# Patient Record
Sex: FEMALE | Race: WHITE | Employment: UNEMPLOYED | URBAN - METROPOLITAN AREA
[De-identification: names, ages, dates, MRNs, and addresses within clinical notes are randomized per-mention and may not be internally consistent; named-entity substitution may affect disease eponyms.]

---

## 2020-05-20 ENCOUNTER — APPOINTMENT (OUTPATIENT)
Dept: ULTRASOUND IMAGING | Facility: HOSPITAL | Age: 30
End: 2020-05-20
Attending: OBSTETRICS & GYNECOLOGY
Payer: COMMERCIAL

## 2020-05-20 ENCOUNTER — HOSPITAL ENCOUNTER (OUTPATIENT)
Facility: HOSPITAL | Age: 30
Setting detail: OBSERVATION
Discharge: HOME | End: 2020-05-22
Attending: OBSTETRICS & GYNECOLOGY | Admitting: OBSTETRICS & GYNECOLOGY
Payer: COMMERCIAL

## 2020-05-20 PROBLEM — Z34.90 PREGNANCY: Status: ACTIVE | Noted: 2020-05-20

## 2020-05-20 PROCEDURE — 76770 US EXAM ABDO BACK WALL COMP: CPT | Performed by: OBSTETRICS & GYNECOLOGY

## 2020-05-20 PROCEDURE — 99225 SUBSEQUENT OBSERVATION CARE: CPT | Performed by: OBSTETRICS & GYNECOLOGY

## 2020-05-20 RX ORDER — HYDROMORPHONE HYDROCHLORIDE 1 MG/ML
0.5 INJECTION, SOLUTION INTRAMUSCULAR; INTRAVENOUS; SUBCUTANEOUS EVERY 2 HOUR PRN
Status: DISCONTINUED | OUTPATIENT
Start: 2020-05-20 | End: 2020-05-20

## 2020-05-20 RX ORDER — HYDROMORPHONE HYDROCHLORIDE 1 MG/ML
INJECTION, SOLUTION INTRAMUSCULAR; INTRAVENOUS; SUBCUTANEOUS
Status: DISPENSED
Start: 2020-05-20 | End: 2020-05-20

## 2020-05-20 RX ORDER — ACETAMINOPHEN 500 MG
1000 TABLET ORAL EVERY 6 HOURS PRN
Status: DISCONTINUED | OUTPATIENT
Start: 2020-05-20 | End: 2020-05-22

## 2020-05-20 RX ORDER — CALCIUM CARBONATE 200(500)MG
1000 TABLET,CHEWABLE ORAL DAILY PRN
Status: DISCONTINUED | OUTPATIENT
Start: 2020-05-20 | End: 2020-05-22

## 2020-05-20 RX ORDER — GLYCERIN/MINERAL OIL
1 LOTION (ML) TOPICAL DAILY
COMMUNITY

## 2020-05-20 RX ORDER — DIPHENHYDRAMINE HYDROCHLORIDE 50 MG/ML
12.5 INJECTION INTRAMUSCULAR; INTRAVENOUS EVERY 4 HOURS PRN
Status: DISCONTINUED | OUTPATIENT
Start: 2020-05-20 | End: 2020-05-22

## 2020-05-20 RX ORDER — HYDROMORPHONE HYDROCHLORIDE 1 MG/ML
INJECTION, SOLUTION INTRAMUSCULAR; INTRAVENOUS; SUBCUTANEOUS
Status: COMPLETED
Start: 2020-05-20 | End: 2020-05-20

## 2020-05-20 RX ORDER — SERTRALINE HYDROCHLORIDE 25 MG/1
25 TABLET, FILM COATED ORAL DAILY
Status: DISCONTINUED | OUTPATIENT
Start: 2020-05-20 | End: 2020-05-22

## 2020-05-20 RX ORDER — CEFAZOLIN SODIUM/WATER 2 G/20 ML
SYRINGE (ML) INTRAVENOUS
Status: COMPLETED
Start: 2020-05-20 | End: 2020-05-20

## 2020-05-20 RX ORDER — CEFAZOLIN SODIUM/WATER 2 G/20 ML
2 SYRINGE (ML) INTRAVENOUS EVERY 8 HOURS
Status: DISCONTINUED | OUTPATIENT
Start: 2020-05-20 | End: 2020-05-22

## 2020-05-20 RX ORDER — SERTRALINE HYDROCHLORIDE 25 MG/1
25 TABLET, FILM COATED ORAL DAILY
COMMUNITY

## 2020-05-20 RX ORDER — ONDANSETRON 2 MG/ML
4 INJECTION INTRAMUSCULAR; INTRAVENOUS EVERY 6 HOURS PRN
Status: DISCONTINUED | OUTPATIENT
Start: 2020-05-20 | End: 2020-05-22

## 2020-05-20 RX ORDER — HYDROMORPHONE HYDROCHLORIDE 1 MG/ML
1 INJECTION, SOLUTION INTRAMUSCULAR; INTRAVENOUS; SUBCUTANEOUS EVERY 2 HOUR PRN
Status: DISCONTINUED | OUTPATIENT
Start: 2020-05-20 | End: 2020-05-20

## 2020-05-20 RX ORDER — SODIUM CHLORIDE, SODIUM LACTATE, POTASSIUM CHLORIDE, CALCIUM CHLORIDE 600; 310; 30; 20 MG/100ML; MG/100ML; MG/100ML; MG/100ML
INJECTION, SOLUTION INTRAVENOUS CONTINUOUS
Status: DISCONTINUED | OUTPATIENT
Start: 2020-05-20 | End: 2020-05-20

## 2020-05-20 RX ORDER — CALCIUM CARBONATE 200(500)MG
500 TABLET,CHEWABLE ORAL
Status: DISCONTINUED | OUTPATIENT
Start: 2020-05-20 | End: 2020-05-20

## 2020-05-20 RX ORDER — ONDANSETRON 2 MG/ML
4 INJECTION INTRAMUSCULAR; INTRAVENOUS EVERY 6 HOURS PRN
Status: DISCONTINUED | OUTPATIENT
Start: 2020-05-20 | End: 2020-05-20

## 2020-05-20 RX ORDER — SODIUM CHLORIDE, SODIUM LACTATE, POTASSIUM CHLORIDE, CALCIUM CHLORIDE 600; 310; 30; 20 MG/100ML; MG/100ML; MG/100ML; MG/100ML
INJECTION, SOLUTION INTRAVENOUS CONTINUOUS
Status: DISCONTINUED | OUTPATIENT
Start: 2020-05-20 | End: 2020-05-22

## 2020-05-20 RX ORDER — BISACODYL 10 MG
10 SUPPOSITORY, RECTAL RECTAL
Status: DISCONTINUED | OUTPATIENT
Start: 2020-05-20 | End: 2020-05-20

## 2020-05-20 RX ORDER — HYDROMORPHONE HYDROCHLORIDE 1 MG/ML
0.5 INJECTION, SOLUTION INTRAMUSCULAR; INTRAVENOUS; SUBCUTANEOUS ONCE
Status: COMPLETED | OUTPATIENT
Start: 2020-05-20 | End: 2020-05-20

## 2020-05-20 RX ORDER — NALOXONE HYDROCHLORIDE 0.4 MG/ML
0.08 INJECTION, SOLUTION INTRAMUSCULAR; INTRAVENOUS; SUBCUTANEOUS
Status: DISCONTINUED | OUTPATIENT
Start: 2020-05-20 | End: 2020-05-22

## 2020-05-20 RX ORDER — TERBUTALINE SULFATE 1 MG/ML
0.25 INJECTION, SOLUTION SUBCUTANEOUS
Status: DISPENSED | OUTPATIENT
Start: 2020-05-20 | End: 2020-05-20

## 2020-05-20 RX ORDER — ZOLPIDEM TARTRATE 5 MG/1
5 TABLET ORAL NIGHTLY PRN
Status: DISCONTINUED | OUTPATIENT
Start: 2020-05-20 | End: 2020-05-20

## 2020-05-20 RX ORDER — HYDROMORPHONE HYDROCHLORIDE 1 MG/ML
1 INJECTION, SOLUTION INTRAMUSCULAR; INTRAVENOUS; SUBCUTANEOUS ONCE
Status: COMPLETED | OUTPATIENT
Start: 2020-05-20 | End: 2020-05-20

## 2020-05-20 NOTE — PROGRESS NOTES
Patient c/o increasing LBP  Afebrile   Now with emesis  Renal ultrasound with bilateral moderate hydronephrosis, no stone seen  Abdomen soft, contractions resolved after SQ terbutaline  Check CBC  Will consult urology

## 2020-05-20 NOTE — PROGRESS NOTES
[facility]  OB/GYN: Antepartum Progress Note     SUBJECTIVE:  Pt is a 26year old  female at 32w4d who was admitted on 2020 for flank pain, hx of kidney pain. Hx of uretopelvic outlet obstruction  Fetal Movement: y.  Vaginal Bleeding: INDICATIONS:  Moderate hydronephrosis, back pain     TECHNIQUE:  Transabdominal gray scale ultrasound imaging of the bilateral kidneys and bladder was performed. Routine technique was utilized.        PATIENT STATED HISTORY: (As transcribed by [employer]

## 2020-05-20 NOTE — PROGRESS NOTES
Pt admitted to Trg 5 with c/o constant back pain and burning at her urethra. States that she has UPJ and suffers from kidney infections. The pain that she is suffering right now is how she feels when she gets a kidney infection.   Denies any vaginal bleed

## 2020-05-20 NOTE — CONSULTS
[facility]  Maternal-Fetal Medicine Inpatient Consultation    Date of Admission:  5/20/2020  Date of Consult:  5/20/2020    Reason for Consult:   Recurrent back/ flank pain; [place]    History of Present Illness: Date: Not recorded     GA: Not recorded     Delivery: Not recorded    Apgar1: Not recorded     Oseas Shaw: Not recorded    Living: Not recorded      Other Relevant History:  Past Medical History:   Diagnosis Date   • Kidney disease     UPJ     Past Surgical H Component Value Date    WBC 15.8 05/20/2020    HGB 11.9 05/20/2020    HCT 35.0 05/20/2020    .0 05/20/2020    CREATSERUM 0.87 05/20/2020    BUN 9 05/20/2020     05/20/2020    K 3.5 05/20/2020     05/20/2020    CO2 23.0 05/20/2020    GL clinically improved. IMPRESSION:   1.  IUP at 32w4d  2.  Reassuring fetal status and normal-appearing ultrasound  3. Maternal clinical presentation consistent with left pyelonephritis  4. Patient has a history of kidney stones and UPJ obstruction  5.

## 2020-05-20 NOTE — CM/SW NOTE
received a consult for patient due to finances.  called patient and reviewed with patient her concerns.  Patient, Ambar Manuel stated that it was not finances, but she just moved to [facility] and needed a OB and Urologist. Case

## 2020-05-20 NOTE — H&P
Patient is 25 y/o   [facility] 20  32w4d  Patient C/O low back pain, across low back  With some dysuria.   Prenatal care in [place], presented here because this is how she felt with kidney infection  Recent renal ultrasound week ago, was referred to [facility]

## 2020-05-20 NOTE — IMAGING NOTE
History: Age: 26 years.   ____________________________________________________________________________  Dating:  LMP: 10/05/2019 EDC: 07/11/2020 GA by LMP: 32w4d  Current Scan on: 05/20/2020 EDC: 07/07/2020 GA by current scan: 33w1d  The calculation of the Wellbeing Assessment:  Amniotic fluid: normal. ALCIDES: 18.3 cm. MVP: 5.7 cm. Q1: 2.6 cm. Q2: 5.4 cm. Q3: 4.6 cm. Q4: 5.7 cm.    Biophysical Profile: Fetal body movements: normal (2), Fetal tone: normal (2), Fetal breathing movements: normal (2), Amniotic fluid

## 2020-05-20 NOTE — CONSULTS
[facility]  Report of Consultation:  Pain Management Service    Roopa Mariel Patient Status:  Observation    1990 MRN [id number]   [facility] 1SW-J Attending Jamie Sheehan MD   Hosp Day # 0 PCP PHYSICIAN NONSTAFF mass index is 29.7 kg/m². Constitutional:  Normal general appearance. Appears to be the recorded age. No acute distress. HEENT:  Head:  Atraumatic and normocephalic. Eyes:  Reveal the extraocular muscles are intact. Lungs:  Clear to auscultation.   Un Agree with preliminary radiology report from [facility] [address] [place] [address] radiology.    Dictated by: Sherrell Kelley MD on 5/20/2020 at 6:26 AM     Finalized by: Sherrell Kelley MD on 5/20/2020 at 6:27 AM            Impression:  Patient Active Problem List:     Acute appen

## 2020-05-20 NOTE — CONSULTS
[facility]    Report of Consultation    Tamiko Zia Patient Status:  Observation    1990 MRN [id number]   [facility] 1SW-J Attending Patricia Phoenix MD   Hosp Day # 0 PCP PHYSICIAN NONSTAFF     Date of Admission:  2020  [place] Fumarate-FA (SM PRENATAL VITAMINS) 28-0.8 MG Oral Tab, Take 1 tablet by mouth daily.         Allergies  No Known Allergies    Review of Systems:     REVIEW OF SYSTEMS:  GENERAL HEALTH: no weight loss, no other complaints aside from HPI  , pregnant  SKIN: de from gravid uterus.          Impression:       HYDRONEPHROSIS-bilat  UPJ OBSTRUCTION-VUR per pt  FLANK PAIN  PREGNANCY  H/O STONES    Urine for culture  Empiric abx  Supportive care, pain control, IVF's  Consider stent if flank pain/clinical picture worsens

## 2020-05-21 PROBLEM — IMO0002 HISTORY OF RENAL STENT: Status: ACTIVE | Noted: 2017-01-25

## 2020-05-21 PROBLEM — N13.5 UPJ (URETEROPELVIC JUNCTION) OBSTRUCTION: Status: ACTIVE | Noted: 2017-06-19

## 2020-05-21 PROCEDURE — 99224 SUBSEQUENT OBSERVATION CARE: CPT | Performed by: OBSTETRICS & GYNECOLOGY

## 2020-05-21 RX ORDER — HYDROCODONE BITARTRATE AND ACETAMINOPHEN 5; 325 MG/1; MG/1
2 TABLET ORAL EVERY 4 HOURS PRN
Status: DISCONTINUED | OUTPATIENT
Start: 2020-05-22 | End: 2020-05-22

## 2020-05-21 RX ORDER — HYDROCODONE BITARTRATE AND ACETAMINOPHEN 5; 325 MG/1; MG/1
1 TABLET ORAL EVERY 4 HOURS PRN
Status: DISCONTINUED | OUTPATIENT
Start: 2020-05-21 | End: 2020-05-22

## 2020-05-21 RX ORDER — CEFAZOLIN SODIUM/WATER 2 G/20 ML
SYRINGE (ML) INTRAVENOUS
Status: COMPLETED
Start: 2020-05-21 | End: 2020-05-21

## 2020-05-21 RX ORDER — HYDROCODONE BITARTRATE AND ACETAMINOPHEN 5; 325 MG/1; MG/1
1 TABLET ORAL ONCE
Status: DISCONTINUED | OUTPATIENT
Start: 2020-05-21 | End: 2020-05-21

## 2020-05-21 NOTE — PLAN OF CARE
Problem: SAFETY ADULT - FALL  Goal: Free from fall injury  Description  INTERVENTIONS:  - Assess pt frequently for physical needs  - Identify cognitive and physical deficits and behaviors that affect risk of falls.   - [place] fall precautions as indica overstimulation of patient)  Outcome: Progressing  Goal: Demonstrates ability to cope with hospitalization/illness  Description  INTERVENTIONS:  - Encourage verbalization of feelings/concerns/expectations  - Provide quiet environment  - Assist patient to i

## 2020-05-21 NOTE — PLAN OF CARE
Problem: SAFETY ADULT - FALL  Goal: Free from fall injury  Description  INTERVENTIONS:  - Assess pt frequently for physical needs  - Identify cognitive and physical deficits and behaviors that affect risk of falls.   - [place] fall precautions as indica overstimulation of patient)  Outcome: Progressing  Goal: Demonstrates ability to cope with hospitalization/illness  Description  INTERVENTIONS:  - Encourage verbalization of feelings/concerns/expectations  - Provide quiet environment  - Assist patient to i

## 2020-05-21 NOTE — PROGRESS NOTES
Report received from Holli Oquendo. . Pt denies any complaints, +FM, denies lof,ctx, or bleeding. POC discussed with pt, pt denies questions. Call light within reach.

## 2020-05-21 NOTE — PLAN OF CARE
Problem: SAFETY ADULT - FALL  Goal: Free from fall injury  Description  INTERVENTIONS:  - Assess pt frequently for physical needs  - Identify cognitive and physical deficits and behaviors that affect risk of falls.   - [place] fall precautions as indica overstimulation of patient)  Outcome: Progressing  Goal: Demonstrates ability to cope with hospitalization/illness  Description  INTERVENTIONS:  - Encourage verbalization of feelings/concerns/expectations  - Provide quiet environment  - Assist patient to i

## 2020-05-21 NOTE — PROGRESS NOTES
Urology  EPNG Salazar PLEAH Note reeviewd. U/S images show moderate bilateral hydro. Pain was acrosss low back earlier, but no lateralizing until severe left flank pain this evening. Pain nearly resolved now. Ex: Smiling. No uterine or flank tend.   Plan

## 2020-05-22 VITALS
DIASTOLIC BLOOD PRESSURE: 60 MMHG | RESPIRATION RATE: 18 BRPM | WEIGHT: 184 LBS | HEART RATE: 87 BPM | BODY MASS INDEX: 29.57 KG/M2 | OXYGEN SATURATION: 95 % | HEIGHT: 66 IN | SYSTOLIC BLOOD PRESSURE: 108 MMHG | TEMPERATURE: 98 F

## 2020-05-22 PROCEDURE — 99217 OBSERVATION CARE DISCHARGE: CPT | Performed by: OBSTETRICS & GYNECOLOGY

## 2020-05-22 PROCEDURE — 59025 FETAL NON-STRESS TEST: CPT | Performed by: OBSTETRICS & GYNECOLOGY

## 2020-05-22 RX ORDER — CEFAZOLIN SODIUM/WATER 2 G/20 ML
SYRINGE (ML) INTRAVENOUS
Status: DISPENSED
Start: 2020-05-22 | End: 2020-05-22

## 2020-05-22 RX ORDER — CEPHALEXIN 500 MG/1
500 CAPSULE ORAL 2 TIMES DAILY
Qty: 10 CAPSULE | Refills: 0 | Status: SHIPPED | OUTPATIENT
Start: 2020-05-22 | End: 2020-05-27

## 2020-05-22 RX ORDER — HYDROCODONE BITARTRATE AND ACETAMINOPHEN 5; 325 MG/1; MG/1
1 TABLET ORAL EVERY 4 HOURS PRN
Qty: 24 TABLET | Refills: 0 | Status: SHIPPED | OUTPATIENT
Start: 2020-05-22

## 2020-05-22 RX ORDER — HYDROCODONE BITARTRATE AND ACETAMINOPHEN 5; 325 MG/1; MG/1
2 TABLET ORAL EVERY 6 HOURS PRN
Status: DISCONTINUED | OUTPATIENT
Start: 2020-05-22 | End: 2020-05-22

## 2020-05-22 NOTE — PAYOR COMM NOTE
--------------  ADMISSION REVIEW     Oksana Mom #:  [id number]  Authorization Number: [id number]    Admit date: N/A  Admit time: N/A       Admitting Physician: Yasmin Cardenas MD  Attending Physician:  Yasmin Cardenas MD  Primary Care Physicia Blood pressure 121/55, pulse 108, temperature 98 °F (36.7 °C), temperature source Oral, resp.  rate 16, height 5' 6\" (1.676 m), weight 184 lb (83.5 kg     Imaging:RBUS  CONCLUSION:  Moderate bilateral hydronephrosis.  This is likely related to mass effect In summary, this is a spring intrauterine pregnancy in cephalic presentation. The overall anatomy appears normal.  The estimated fetal weight is 4 pounds 7 ounces (2021 g) which is consistent with growth at the 47th percentile.   Biophysical profile sco Last Dose: Stopped (05/21/20 1243)  Start: 05/20/20 1215 End: 05/21/20 1232    Order specific questions:   Continuous Infusion Rate (in mg/hr): 0  PCA Dose (in mg): 0.2  PCA Lockout (in minutes): 10  One Hour Delivery Limit (in mg): 1.2    1258-New Bag   1 Start: 05/20/20 0829 End: 05/20/20 1305    0837-Given   1045-Given   1245-Given   1305-D/C'd         ondansetron HCl (ZOFRAN) injection 4 mg   Dose: 4 mg  Freq: Every 6 hours PRN Route: IV  PRN Reason: Nausea  Start: 05/20/20 0523 End: 05/20/20 1305    053

## 2020-05-22 NOTE — PLAN OF CARE
Problem: SAFETY ADULT - FALL  Goal: Free from fall injury  Description  INTERVENTIONS:  - Assess pt frequently for physical needs  - Identify cognitive and physical deficits and behaviors that affect risk of falls.   - [place] fall precautions as indica

## 2020-05-22 NOTE — PROGRESS NOTES
Pt discharged home via wheelchair  Pt in stable condition  Pt accompanied byRN  Discharge instructions given  Pt to follow up with Dr. Indira Salazar on Next week

## 2020-05-22 NOTE — PLAN OF CARE
Problem: SAFETY ADULT - FALL  Goal: Free from fall injury  Description  INTERVENTIONS:  - Assess pt frequently for physical needs  - Identify cognitive and physical deficits and behaviors that affect risk of falls.   - [place] fall precautions as indica
